# Patient Record
Sex: FEMALE | Race: WHITE | NOT HISPANIC OR LATINO | URBAN - METROPOLITAN AREA
[De-identification: names, ages, dates, MRNs, and addresses within clinical notes are randomized per-mention and may not be internally consistent; named-entity substitution may affect disease eponyms.]

---

## 2023-04-18 ASSESSMENT — PROMIS GLOBAL HEALTH SCALE
EMOTIONAL_PROBLEMS: ALWAYS
RATE_AVERAGE_PAIN: 5
RATE_QUALITY_OF_LIFE: FAIR
RATE_MENTAL_HEALTH: FAIR
RATE_SOCIAL_SATISFACTION: FAIR
RATE_AVERAGE_FATIGUE: MODERATE
CARRYOUT_PHYSICAL_ACTIVITIES: COMPLETELY
RATE_GENERAL_HEALTH: FAIR
RATE_MENTAL_HEALTH: FAIR
CARRYOUT_SOCIAL_ACTIVITIES: POOR
RATE_SOCIAL_SATISFACTION: FAIR
RATE_AVERAGE_PAIN: 5
RATE_PHYSICAL_HEALTH: FAIR
RATE_AVERAGE_FATIGUE: MODERATE
CARRYOUT_SOCIAL_ACTIVITIES: POOR
EMOTIONAL_PROBLEMS: ALWAYS
RATE_QUALITY_OF_LIFE: FAIR
RATE_PHYSICAL_HEALTH: FAIR
CARRYOUT_PHYSICAL_ACTIVITIES: COMPLETELY
RATE_GENERAL_HEALTH: FAIR

## 2023-04-20 ENCOUNTER — APPOINTMENT (OUTPATIENT)
Dept: PRIMARY CARE | Facility: CLINIC | Age: 38
End: 2023-04-20
Payer: COMMERCIAL

## 2023-04-20 PROBLEM — F33.41 RECURRENT MAJOR DEPRESSIVE DISORDER, IN PARTIAL REMISSION (CMS-HCC): Status: ACTIVE | Noted: 2023-04-20

## 2023-04-20 PROBLEM — F31.9 BIPOLAR 1 DISORDER (MULTI): Status: ACTIVE | Noted: 2023-04-20

## 2023-04-20 PROBLEM — F90.9 ADHD: Status: ACTIVE | Noted: 2023-04-20

## 2023-04-20 RX ORDER — SERTRALINE HYDROCHLORIDE 100 MG/1
150 TABLET, FILM COATED ORAL DAILY
COMMUNITY
Start: 2023-01-27 | End: 2023-09-11 | Stop reason: ALTCHOICE

## 2023-05-31 ENCOUNTER — OFFICE VISIT (OUTPATIENT)
Dept: PRIMARY CARE | Facility: CLINIC | Age: 38
End: 2023-05-31
Payer: COMMERCIAL

## 2023-05-31 VITALS
SYSTOLIC BLOOD PRESSURE: 98 MMHG | RESPIRATION RATE: 18 BRPM | DIASTOLIC BLOOD PRESSURE: 62 MMHG | HEART RATE: 70 BPM | TEMPERATURE: 98.6 F | WEIGHT: 150 LBS | HEIGHT: 69 IN | BODY MASS INDEX: 22.22 KG/M2

## 2023-05-31 DIAGNOSIS — N92.6 IRREGULAR MENSES: ICD-10-CM

## 2023-05-31 DIAGNOSIS — F31.9 BIPOLAR 1 DISORDER (MULTI): ICD-10-CM

## 2023-05-31 DIAGNOSIS — Z11.3 ROUTINE SCREENING FOR STI (SEXUALLY TRANSMITTED INFECTION): ICD-10-CM

## 2023-05-31 DIAGNOSIS — Z00.00 ENCOUNTER FOR PREVENTIVE HEALTH EXAMINATION: Primary | ICD-10-CM

## 2023-05-31 PROCEDURE — 99395 PREV VISIT EST AGE 18-39: CPT | Performed by: FAMILY MEDICINE

## 2023-05-31 PROCEDURE — 1036F TOBACCO NON-USER: CPT | Performed by: FAMILY MEDICINE

## 2023-05-31 RX ORDER — PRAZOSIN HYDROCHLORIDE 1 MG/1
1 CAPSULE ORAL
COMMUNITY
Start: 2023-04-27 | End: 2023-09-11 | Stop reason: ALTCHOICE

## 2023-05-31 RX ORDER — LAMOTRIGINE 25 MG/1
2 TABLET ORAL DAILY
COMMUNITY
Start: 2023-05-10 | End: 2023-09-11 | Stop reason: ALTCHOICE

## 2023-05-31 RX ORDER — METHYLPHENIDATE HYDROCHLORIDE 54 MG/1
54 TABLET ORAL
COMMUNITY
Start: 2022-03-09 | End: 2023-05-31 | Stop reason: ALTCHOICE

## 2023-05-31 NOTE — PROGRESS NOTES
Renea Montana is a 37 y.o. female here today for   Chief Complaint   Patient presents with    Annual Exam   Last pap 2 years ago, looking for new GYN .      No PMH of STI.  Would like to have STI screening.  Last pap was 2 years ago and normal.  She reports no symptoms to suggest an STI.  She has had 1 partner in the last 7 years but he does sleep with other women.    She did see the psychiatrist and a counselor now.  She is back on lamotrigine and they added prazosin at bedtime.  She is also taking sertraline.  She says that her bipolar symptoms seem stable.    Over the last 3 months - Menses are lasting longer and sometimes 3 weeks long.  But then are very light and irregular.  She does have a history of irregular menses off and on over the last 5 years.  She is not currently on any form of contraception.  She says there is no chance of pregnancy.  She would like to establish with a new gynecologist.  [facility] has been [facility].        Objective    Visit Vitals  BP 98/62   Pulse 70   Temp 37 °C (98.6 °F)   Resp 18     Body mass index is 22.15 kg/m².     Physical Exam  Vitals and nursing note reviewed.   Constitutional:       General: She is not in acute distress.     Appearance: Normal appearance.   HENT:      Head: Normocephalic and atraumatic.      Right Ear: Tympanic membrane, ear canal and external ear normal.      Left Ear: Tympanic membrane, ear canal and external ear normal.      Nose: Nose normal.      Mouth/Throat:      Mouth: Mucous membranes are moist.      Pharynx: Oropharynx is clear.   Eyes:      Extraocular Movements: Extraocular movements intact.      Conjunctiva/sclera: Conjunctivae normal.      Pupils: Pupils are equal, round, and reactive to light.   Cardiovascular:      Rate and Rhythm: Normal rate and regular rhythm.      Pulses: Normal pulses.      Heart sounds: Normal heart sounds. No murmur heard.     No friction rub. No gallop.   Pulmonary:      Effort: Pulmonary effort is  normal. No respiratory distress.      Breath sounds: Normal breath sounds.   Chest:   Breasts:     Right: Normal. No mass, nipple discharge or skin change.      Left: Normal. No mass, nipple discharge or skin change.   Abdominal:      General: Abdomen is flat. Bowel sounds are normal. There is no distension.      Palpations: Abdomen is soft.      Tenderness: There is no abdominal tenderness.   Musculoskeletal:         General: Normal range of motion.      Cervical back: Normal range of motion and neck supple.   Lymphadenopathy:      Cervical: No cervical adenopathy.      Upper Body:      Right upper body: No axillary adenopathy.      Left upper body: No axillary adenopathy.   Skin:     General: Skin is warm and dry.      Findings: No lesion or rash.   Neurological:      General: No focal deficit present.      Mental Status: She is alert. Mental status is at baseline.   Psychiatric:         Mood and Affect: Mood normal.         Behavior: Behavior normal.         Thought Content: Thought content normal.         Judgment: Judgment normal.            Assessment    1. Encounter for preventive health examination  Comprehensive Metabolic Panel, CBC, Lipid Panel, TSH with reflex to Free T4 if abnormal, Vitamin D, Total   Recommend regular exercise, balanced diet, regular dental exams, and healthy habits.  Appropriate labs ordered or reviewed.  Her immunizations are up-to-date.  Her Pap tests are up-to-date and I recommend monthly self breast exams.  We discussed safe sex and condom use.  I recommend a yearly flu shot in the fall and I recommend a yearly wellness exam.     2. Irregular menses  Referral to Obstetrics / Gynecology   She would like to establish with a new gynecologist for irregular menses and routine gynecological care.  I will refer her to Dr. Castillo.     3. Routine screening for STI (sexually transmitted infection)  C. Trachomatis / N. Gonorrhoeae, Amplified Detection, HIV 1/2 Antigen/Antibody Screen with  Reflex to Confirmation, Syphilis Screen with Reflex, Hepatitis B Core Antibody, IgM, Hepatitis C Antibody   We discussed STIs that can be checked for with urine and blood tests.  She would like to have all of these test done and we will discuss it further with her new gynecologist at their first appointment.     4. Bipolar 1 disorder (CMS/HCC)     Patient is now seeing a psychiatrist and counselor and seems to be doing well on her current medications.

## 2023-06-02 ENCOUNTER — LAB (OUTPATIENT)
Dept: LAB | Facility: LAB | Age: 38
End: 2023-06-02
Payer: COMMERCIAL

## 2023-06-02 DIAGNOSIS — Z11.3 ROUTINE SCREENING FOR STI (SEXUALLY TRANSMITTED INFECTION): ICD-10-CM

## 2023-06-02 DIAGNOSIS — Z00.00 ENCOUNTER FOR PREVENTIVE HEALTH EXAMINATION: ICD-10-CM

## 2023-06-02 LAB
ALANINE AMINOTRANSFERASE (SGPT) (U/L) IN SER/PLAS: 16 U/L (ref 7–45)
ALBUMIN (G/DL) IN SER/PLAS: 4.1 G/DL (ref 3.4–5)
ALKALINE PHOSPHATASE (U/L) IN SER/PLAS: 46 U/L (ref 33–110)
ANION GAP IN SER/PLAS: 13 MMOL/L (ref 10–20)
ASPARTATE AMINOTRANSFERASE (SGOT) (U/L) IN SER/PLAS: 19 U/L (ref 9–39)
BILIRUBIN TOTAL (MG/DL) IN SER/PLAS: 0.6 MG/DL (ref 0–1.2)
CALCIDIOL (25 OH VITAMIN D3) (NG/ML) IN SER/PLAS: 28 NG/ML
CALCIUM (MG/DL) IN SER/PLAS: 9.2 MG/DL (ref 8.6–10.3)
CARBON DIOXIDE, TOTAL (MMOL/L) IN SER/PLAS: 27 MMOL/L (ref 21–32)
CHLORIDE (MMOL/L) IN SER/PLAS: 101 MMOL/L (ref 98–107)
CHOLESTEROL (MG/DL) IN SER/PLAS: 151 MG/DL (ref 0–199)
CHOLESTEROL IN HDL (MG/DL) IN SER/PLAS: 74.3 MG/DL
CHOLESTEROL/HDL RATIO: 2
CREATININE (MG/DL) IN SER/PLAS: 0.78 MG/DL (ref 0.5–1.05)
ERYTHROCYTE DISTRIBUTION WIDTH (RATIO) BY AUTOMATED COUNT: 11.6 % (ref 11.5–14.5)
ERYTHROCYTE MEAN CORPUSCULAR HEMOGLOBIN CONCENTRATION (G/DL) BY AUTOMATED: 32.4 G/DL (ref 32–36)
ERYTHROCYTE MEAN CORPUSCULAR VOLUME (FL) BY AUTOMATED COUNT: 93 FL (ref 80–100)
ERYTHROCYTES (10*6/UL) IN BLOOD BY AUTOMATED COUNT: 4.57 X10E12/L (ref 4–5.2)
GFR FEMALE: >90 ML/MIN/1.73M2
GLUCOSE (MG/DL) IN SER/PLAS: 97 MG/DL (ref 74–99)
HEMATOCRIT (%) IN BLOOD BY AUTOMATED COUNT: 42.6 % (ref 36–46)
HEMOGLOBIN (G/DL) IN BLOOD: 13.8 G/DL (ref 12–16)
HEPATITIS B VIRUS CORE IGM AB PRESENCE IN SER/PLAS BY IMMUNOASSY: NONREACTIVE
HEPATITIS C VIRUS AB PRESENCE IN SERUM: NONREACTIVE
HIV 1/ 2 AG/AB SCREEN: NONREACTIVE
LDL: 60 MG/DL (ref 0–99)
LEUKOCYTES (10*3/UL) IN BLOOD BY AUTOMATED COUNT: 6.2 X10E9/L (ref 4.4–11.3)
NRBC (PER 100 WBCS) BY AUTOMATED COUNT: 0 /100 WBC (ref 0–0)
PLATELETS (10*3/UL) IN BLOOD AUTOMATED COUNT: 234 X10E9/L (ref 150–450)
POTASSIUM (MMOL/L) IN SER/PLAS: 4.6 MMOL/L (ref 3.5–5.3)
PROTEIN TOTAL: 6.6 G/DL (ref 6.4–8.2)
SODIUM (MMOL/L) IN SER/PLAS: 136 MMOL/L (ref 136–145)
SYPHILIS TOTAL AB: NONREACTIVE
THYROTROPIN (MIU/L) IN SER/PLAS BY DETECTION LIMIT <= 0.05 MIU/L: 0.64 MIU/L (ref 0.44–3.98)
TRIGLYCERIDE (MG/DL) IN SER/PLAS: 84 MG/DL (ref 0–149)
UREA NITROGEN (MG/DL) IN SER/PLAS: 8 MG/DL (ref 6–23)
VLDL: 17 MG/DL (ref 0–40)

## 2023-06-02 PROCEDURE — 86803 HEPATITIS C AB TEST: CPT

## 2023-06-02 PROCEDURE — 86780 TREPONEMA PALLIDUM: CPT

## 2023-06-02 PROCEDURE — 80053 COMPREHEN METABOLIC PANEL: CPT

## 2023-06-02 PROCEDURE — 84443 ASSAY THYROID STIM HORMONE: CPT

## 2023-06-02 PROCEDURE — 86705 HEP B CORE ANTIBODY IGM: CPT

## 2023-06-02 PROCEDURE — 36415 COLL VENOUS BLD VENIPUNCTURE: CPT

## 2023-06-02 PROCEDURE — 85027 COMPLETE CBC AUTOMATED: CPT

## 2023-06-02 PROCEDURE — 80061 LIPID PANEL: CPT

## 2023-06-02 PROCEDURE — 82306 VITAMIN D 25 HYDROXY: CPT

## 2023-06-02 PROCEDURE — 87491 CHLMYD TRACH DNA AMP PROBE: CPT

## 2023-06-02 PROCEDURE — 87389 HIV-1 AG W/HIV-1&-2 AB AG IA: CPT

## 2023-06-02 PROCEDURE — 87591 N.GONORRHOEAE DNA AMP PROB: CPT

## 2023-06-03 LAB
CHLAMYDIA TRACH., AMPLIFIED: NEGATIVE
N. GONORRHEA, AMPLIFIED: NEGATIVE

## 2023-06-05 ENCOUNTER — TELEPHONE (OUTPATIENT)
Dept: PRIMARY CARE | Facility: CLINIC | Age: 38
End: 2023-06-05
Payer: COMMERCIAL

## 2023-06-05 NOTE — TELEPHONE ENCOUNTER
----- Message from Eduardo Sevilla MD sent at 6/5/2023  7:38 AM EDT -----  Inform patient of normal results of all recent labs.   Even though some of the lab values may fall outside of the normal range, I do not feel they are clinically concerning or relevant at this time.    All of the STI screening was negative.  Her vitamin D level was slightly low at 28.  I recommend that she take 2000 units of vitamin D over-the-counter daily.

## 2023-06-05 NOTE — RESULT ENCOUNTER NOTE
Inform patient of normal results of all recent labs.   Even though some of the lab values may fall outside of the normal range, I do not feel they are clinically concerning or relevant at this time.    All of the STI screening was negative.  Her vitamin D level was slightly low at 28.  I recommend that she take 2000 units of vitamin D over-the-counter daily.

## 2023-06-08 ENCOUNTER — APPOINTMENT (OUTPATIENT)
Dept: PRIMARY CARE | Facility: CLINIC | Age: 38
End: 2023-06-08
Payer: COMMERCIAL

## 2023-07-25 ENCOUNTER — APPOINTMENT (OUTPATIENT)
Dept: PRIMARY CARE | Facility: CLINIC | Age: 38
End: 2023-07-25
Payer: COMMERCIAL

## 2023-09-11 ENCOUNTER — PROCEDURE VISIT (OUTPATIENT)
Dept: PRIMARY CARE | Facility: CLINIC | Age: 38
End: 2023-09-11
Payer: COMMERCIAL

## 2023-09-11 VITALS
SYSTOLIC BLOOD PRESSURE: 118 MMHG | RESPIRATION RATE: 16 BRPM | DIASTOLIC BLOOD PRESSURE: 72 MMHG | HEIGHT: 69 IN | TEMPERATURE: 97.1 F | BODY MASS INDEX: 22.36 KG/M2 | WEIGHT: 151 LBS | HEART RATE: 80 BPM

## 2023-09-11 DIAGNOSIS — L81.9 CHANGE IN MULTIPLE PIGMENTED SKIN LESIONS: Primary | ICD-10-CM

## 2023-09-11 PROBLEM — F32.A DEPRESSION: Status: ACTIVE | Noted: 2019-01-22

## 2023-09-11 PROBLEM — F40.10 SOCIAL PHOBIA, UNSPECIFIED: Status: ACTIVE | Noted: 2021-05-25

## 2023-09-11 PROBLEM — F41.1 GENERALIZED ANXIETY DISORDER: Status: ACTIVE | Noted: 2021-05-25

## 2023-09-11 PROBLEM — F98.8 OTHER SPECIFIED BEHAVIORAL AND EMOTIONAL DISORDERS WITH ONSET USUALLY OCCURRING IN CHILDHOOD AND ADOLESCENCE: Status: ACTIVE | Noted: 2021-05-25

## 2023-09-11 PROBLEM — F32.0 MILD SINGLE CURRENT EPISODE OF MAJOR DEPRESSIVE DISORDER (CMS-HCC): Status: ACTIVE | Noted: 2017-09-14

## 2023-09-11 PROCEDURE — 11301 SHAVE SKIN LESION 0.6-1.0 CM: CPT | Performed by: FAMILY MEDICINE

## 2023-09-11 NOTE — PROGRESS NOTES
"Renea Montana is a 38 y.o. female here today for   Chief Complaint   Patient presents with    Nevus   Possible mole removal on lower back      HPI   Patient is here for shave removal of 2 changing nevi of her skin.  First is on her left lower back and about 8 mm in size.  Present for years but has slowly gotten bigger and more dark.  The second nevus is on her central upper abdomen and is about 6 mm in size with a similar history.      Current Outpatient Medications:     lamoTRIgine (LaMICtal) 25 mg tablet, Take 2 tablets (50 mg) by mouth once daily., Disp: , Rfl:     prazosin (Minipress) 1 mg capsule, Take 1 capsule (1 mg) by mouth. AT BEDTIME, Disp: , Rfl:     sertraline (Zoloft) 100 mg tablet, Take 1.5 tablets (150 mg) by mouth once daily., Disp: , Rfl:     Patient Active Problem List   Diagnosis    Bipolar 1 disorder (CMS/HCC)    ADHD    Recurrent major depressive disorder, in partial remission (CMS/HCC)    Irregular menses    Alteration of consciousness    Blurred vision, right eye    Clonus    Depression    Generalized anxiety disorder    Hyper reflexia    Other specified behavioral and emotional disorders with onset usually occurring in childhood and adolescence    Mild single current episode of major depressive disorder (CMS/HCC)    Social phobia, unspecified    Tingling    Tremor    Urinary frequency         No results found for this or any previous visit (from the past 672 hour(s)).     Objective    Visit Vitals  Temp 36.2 °C (97.1 °F)   Resp 16   Ht 1.753 m (5' 9\")   Wt 68.5 kg (151 lb)   BMI 22.30 kg/m²     Body mass index is 22.3 kg/m².     Physical Exam       Assessment    No diagnosis found.    Shaving Epidermal/Dermal    Date/Time: 9/11/2023 12:51 PM    Performed by: Eduardo Sevilla MD  Authorized by: Eduardo Sevilla MD    Number of Lesions: 2  Lesion 1:     Body area: trunk    Trunk location: back    Initial size (mm): 8    Malignancy: malignancy unknown      Destruction method: scissors used " for extraction    Lesion 2:     Body area: trunk    Trunk location: abdomen    Initial size (mm): 6    Malignancy: malignancy unknown      Destruction method: scissors used for extraction      Comments:  Patient and I discussed the risk versus benefits of a shave biopsy for these 2 lesions.  I explained possible complications including scarring, bleeding, pain.  Patient fully understands and consents to the procedure.  Sterile technique was used throughout.  Both lesions were prepped with iodine and sterilely draped.  Each was anesthetized with 1% lidocaine with epinephrine 0.1 mL for each lesion.  A shave biopsy was done of both lesions using curved iris scissors and silver nitrate sticks were used to control very mild bleeding.  Both lesions were sent for pathology.  The areas were dressed with antibiotic ointment and a Band-Aid.  Patient tolerated the procedure very well with no complications.  We will call her when pathology is available.  If she does not hear from us in 2 weeks she should call us.  We will follow-up as needed.

## 2023-09-14 NOTE — PROGRESS NOTES
Renea Montana is a 38 y.o. female here today for No chief complaint on file.       HPI       No current outpatient medications on file.    Patient Active Problem List   Diagnosis    Bipolar 1 disorder (CMS/HCC)    ADHD    Recurrent major depressive disorder, in partial remission (CMS/HCC)    Irregular menses    Alteration of consciousness    Blurred vision, right eye    Clonus    Depression    Generalized anxiety disorder    Hyper reflexia    Other specified behavioral and emotional disorders with onset usually occurring in childhood and adolescence    Mild single current episode of major depressive disorder (CMS/HCC)    Social phobia, unspecified    Tingling    Tremor    Urinary frequency         No results found for this or any previous visit (from the past 672 hour(s)).     Objective    Visit Vitals  There were no vitals taken for this visit.    There is no height or weight on file to calculate BMI.     Physical Exam       Assessment    No diagnosis found.

## 2023-09-18 ENCOUNTER — TELEPHONE (OUTPATIENT)
Dept: PRIMARY CARE | Facility: CLINIC | Age: 38
End: 2023-09-18
Payer: COMMERCIAL

## 2024-03-27 ENCOUNTER — OFFICE VISIT (OUTPATIENT)
Dept: PRIMARY CARE | Facility: CLINIC | Age: 39
End: 2024-03-27
Payer: COMMERCIAL

## 2024-03-27 VITALS
TEMPERATURE: 97.4 F | RESPIRATION RATE: 16 BRPM | WEIGHT: 154 LBS | BODY MASS INDEX: 22.81 KG/M2 | HEIGHT: 69 IN | SYSTOLIC BLOOD PRESSURE: 122 MMHG | HEART RATE: 93 BPM | DIASTOLIC BLOOD PRESSURE: 80 MMHG

## 2024-03-27 DIAGNOSIS — R20.2 NUMBNESS AND TINGLING OF LOWER EXTREMITY: ICD-10-CM

## 2024-03-27 DIAGNOSIS — R20.2 NUMBNESS AND TINGLING OF BOTH UPPER EXTREMITIES: Primary | ICD-10-CM

## 2024-03-27 DIAGNOSIS — R20.0 NUMBNESS AND TINGLING OF BOTH UPPER EXTREMITIES: Primary | ICD-10-CM

## 2024-03-27 DIAGNOSIS — R20.0 NUMBNESS AND TINGLING OF LOWER EXTREMITY: ICD-10-CM

## 2024-03-27 DIAGNOSIS — R68.89 COLD INTOLERANCE: ICD-10-CM

## 2024-03-27 PROCEDURE — 99214 OFFICE O/P EST MOD 30 MIN: CPT | Performed by: FAMILY MEDICINE

## 2024-03-27 PROCEDURE — 1036F TOBACCO NON-USER: CPT | Performed by: FAMILY MEDICINE

## 2024-03-27 NOTE — PROGRESS NOTES
"Renea Montana is a 38 y.o. female here today for   Chief Complaint   Patient presents with    Numbness     Hands, feet, and legs  Intense past 2 years         HPI   Present for a few years.  Mild and constant and much worse when cold.  Hands, feet and legs.  She says she notices a mild decreased sensation in hands and fingers.  Feet are very sensitive - get sore easily.  Tight shoes hurt a lot.  Gets blisters and sores on feet easily.  No color changes noted.  Legs feel more weak and  feels more weak at times.  But it is nothing that is preventing her from doing her regular activities.  She says the only reason she came in was because her mom insisted on it.  There is no family history of degenerative neurological conditions.  She has no history of lead or mercury exposure.  She does not drink alcohol or use tobacco products or drugs.  There is no chance of pregnancy.    Also with occasional \"nerve pain in colon\" about 1-2 times per month.  Wakes her up with shooting pain and bad for a few minutes and then resolves.      No current outpatient medications on file.    Patient Active Problem List   Diagnosis    Bipolar 1 disorder (CMS/HCC)    ADHD    Recurrent major depressive disorder, in partial remission (CMS/HCC)    Irregular menses    Alteration of consciousness    Blurred vision, right eye    Clonus    Depression    Generalized anxiety disorder    Hyper reflexia    Other specified behavioral and emotional disorders with onset usually occurring in childhood and adolescence    Mild single current episode of major depressive disorder (CMS/HCC)    Social phobia, unspecified    Tingling    Tremor    Urinary frequency    Numbness and tingling of both upper extremities    Numbness and tingling of lower extremity    Cold intolerance         No results found for this or any previous visit (from the past 672 hour(s)).     Objective    Visit Vitals    Visit Vitals  /80   Pulse 93   Temp 36.3 °C (97.4 °F)   Resp " "16   Ht 1.753 m (5' 9\")   Wt 69.9 kg (154 lb)   BMI 22.74 kg/m²   Smoking Status Never   BSA 1.84 m²       Body mass index is 22.74 kg/m².     Physical Exam   Hands-normal pulses and color and temperature with good capillary refill.  Normal sensation with monofilament testing in all nerve distributions.  Normal strength and range of motion.    Feet-normal pulses and color and temperature with good capillary refill.  Normal sensation with monofilament testing in all nerve distributions.  Normal strength and range of motion.    Assessment    1. Numbness and tingling of both upper extremities  Comprehensive Metabolic Panel, CBC, Thyroid Stimulating Hormone, Vitamin D 25-Hydroxy,Total (for eval of Vitamin D levels), Vitamin B12, Folate, Ferritin, C-Reactive Protein, Sedimentation Rate, TRACI with Reflex to FIFI, Lead, blood, Heavy Metals, Blood, Referral to Neurology   I am not sure what is causing her symptoms and we discussed options for further evaluation.  We are going to get some labs to rule out possible etiologies and also look for autoimmune issues.  I am going to refer her to neurology for further evaluation.  She probably will need a EMG to further define these issues.  We will call her with results and follow-up accordingly.     2. Numbness and tingling of lower extremity  Comprehensive Metabolic Panel, CBC, Thyroid Stimulating Hormone, Vitamin D 25-Hydroxy,Total (for eval of Vitamin D levels), Vitamin B12, Folate, Ferritin, C-Reactive Protein, Sedimentation Rate, TRACI with Reflex to FIFI, Lead, blood, Heavy Metals, Blood, Referral to Neurology      3. Cold intolerance  Comprehensive Metabolic Panel, CBC, Thyroid Stimulating Hormone, Vitamin D 25-Hydroxy,Total (for eval of Vitamin D levels), Vitamin B12, Folate, Ferritin, C-Reactive Protein, Sedimentation Rate, TRACI with Reflex to FIFI, Lead, blood, Heavy Metals, Blood, Referral to Neurology                  Orders Placed This Encounter   Procedures    Comprehensive " Metabolic Panel    CBC    Thyroid Stimulating Hormone    Vitamin D 25-Hydroxy,Total (for eval of Vitamin D levels)    Vitamin B12    Folate    Ferritin    C-Reactive Protein    Sedimentation Rate    TRACI with Reflex to FIFI    Lead, blood    Heavy Metals, Blood    Referral to Neurology        No orders of the defined types were placed in this encounter.

## 2024-03-28 ENCOUNTER — LAB (OUTPATIENT)
Dept: LAB | Facility: LAB | Age: 39
End: 2024-03-28
Payer: COMMERCIAL

## 2024-03-28 DIAGNOSIS — R20.0 NUMBNESS AND TINGLING OF BOTH UPPER EXTREMITIES: ICD-10-CM

## 2024-03-28 DIAGNOSIS — R20.2 NUMBNESS AND TINGLING OF BOTH UPPER EXTREMITIES: ICD-10-CM

## 2024-03-28 DIAGNOSIS — R20.2 NUMBNESS AND TINGLING OF LOWER EXTREMITY: ICD-10-CM

## 2024-03-28 DIAGNOSIS — R20.0 NUMBNESS AND TINGLING OF LOWER EXTREMITY: ICD-10-CM

## 2024-03-28 DIAGNOSIS — R68.89 COLD INTOLERANCE: ICD-10-CM

## 2024-03-28 LAB
25(OH)D3 SERPL-MCNC: 37 NG/ML (ref 30–100)
ALBUMIN SERPL BCP-MCNC: 4.5 G/DL (ref 3.4–5)
ALP SERPL-CCNC: 42 U/L (ref 33–110)
ALT SERPL W P-5'-P-CCNC: 14 U/L (ref 7–45)
ANION GAP SERPL CALC-SCNC: 11 MMOL/L (ref 10–20)
AST SERPL W P-5'-P-CCNC: 16 U/L (ref 9–39)
BILIRUB SERPL-MCNC: 0.4 MG/DL (ref 0–1.2)
BUN SERPL-MCNC: 9 MG/DL (ref 6–23)
CALCIUM SERPL-MCNC: 9.7 MG/DL (ref 8.6–10.3)
CHLORIDE SERPL-SCNC: 103 MMOL/L (ref 98–107)
CO2 SERPL-SCNC: 29 MMOL/L (ref 21–32)
CREAT SERPL-MCNC: 0.69 MG/DL (ref 0.5–1.05)
CRP SERPL-MCNC: <0.1 MG/DL
EGFRCR SERPLBLD CKD-EPI 2021: >90 ML/MIN/1.73M*2
ERYTHROCYTE [DISTWIDTH] IN BLOOD BY AUTOMATED COUNT: 12 % (ref 11.5–14.5)
ERYTHROCYTE [SEDIMENTATION RATE] IN BLOOD BY WESTERGREN METHOD: 13 MM/H (ref 0–20)
FERRITIN SERPL-MCNC: 60 NG/ML (ref 8–150)
FOLATE SERPL-MCNC: 20.5 NG/ML
GLUCOSE SERPL-MCNC: 105 MG/DL (ref 74–99)
HCT VFR BLD AUTO: 44.5 % (ref 36–46)
HGB BLD-MCNC: 14.2 G/DL (ref 12–16)
LEAD BLD-MCNC: 0.5 UG/DL
MCH RBC QN AUTO: 30.1 PG (ref 26–34)
MCHC RBC AUTO-ENTMCNC: 31.9 G/DL (ref 32–36)
MCV RBC AUTO: 94 FL (ref 80–100)
NRBC BLD-RTO: 0 /100 WBCS (ref 0–0)
PLATELET # BLD AUTO: 274 X10*3/UL (ref 150–450)
POTASSIUM SERPL-SCNC: 3.8 MMOL/L (ref 3.5–5.3)
PROT SERPL-MCNC: 7.1 G/DL (ref 6.4–8.2)
RBC # BLD AUTO: 4.72 X10*6/UL (ref 4–5.2)
SODIUM SERPL-SCNC: 139 MMOL/L (ref 136–145)
TSH SERPL-ACNC: 0.68 MIU/L (ref 0.44–3.98)
VIT B12 SERPL-MCNC: 323 PG/ML (ref 211–911)
WBC # BLD AUTO: 5.5 X10*3/UL (ref 4.4–11.3)

## 2024-03-28 PROCEDURE — 83825 ASSAY OF MERCURY: CPT

## 2024-03-28 PROCEDURE — 84443 ASSAY THYROID STIM HORMONE: CPT

## 2024-03-28 PROCEDURE — 82746 ASSAY OF FOLIC ACID SERUM: CPT

## 2024-03-28 PROCEDURE — 86038 ANTINUCLEAR ANTIBODIES: CPT

## 2024-03-28 PROCEDURE — 82306 VITAMIN D 25 HYDROXY: CPT

## 2024-03-28 PROCEDURE — 86140 C-REACTIVE PROTEIN: CPT

## 2024-03-28 PROCEDURE — 36415 COLL VENOUS BLD VENIPUNCTURE: CPT

## 2024-03-28 PROCEDURE — 82728 ASSAY OF FERRITIN: CPT

## 2024-03-28 PROCEDURE — 83655 ASSAY OF LEAD: CPT

## 2024-03-28 PROCEDURE — 80053 COMPREHEN METABOLIC PANEL: CPT

## 2024-03-28 PROCEDURE — 85652 RBC SED RATE AUTOMATED: CPT

## 2024-03-28 PROCEDURE — 82607 VITAMIN B-12: CPT

## 2024-03-28 PROCEDURE — 85027 COMPLETE CBC AUTOMATED: CPT

## 2024-03-29 LAB — ANA SER QL HEP2 SUBST: NEGATIVE

## 2024-03-30 LAB
ARSENIC BLD-MCNC: <10 UG/L
LEAD BLDV-MCNC: <2 UG/DL
MERCURY BLD-MCNC: <2.5 UG/L

## 2024-08-21 ENCOUNTER — APPOINTMENT (OUTPATIENT)
Dept: NEUROLOGY | Facility: CLINIC | Age: 39
End: 2024-08-21
Payer: COMMERCIAL

## 2024-08-21 VITALS
TEMPERATURE: 97.1 F | WEIGHT: 139 LBS | HEIGHT: 69 IN | DIASTOLIC BLOOD PRESSURE: 64 MMHG | SYSTOLIC BLOOD PRESSURE: 102 MMHG | BODY MASS INDEX: 20.59 KG/M2 | HEART RATE: 80 BPM

## 2024-08-21 DIAGNOSIS — R68.89 COLD INTOLERANCE: ICD-10-CM

## 2024-08-21 DIAGNOSIS — R20.2 NUMBNESS AND TINGLING OF LOWER EXTREMITY: ICD-10-CM

## 2024-08-21 DIAGNOSIS — R20.2 NUMBNESS AND TINGLING OF BOTH UPPER EXTREMITIES: ICD-10-CM

## 2024-08-21 DIAGNOSIS — R20.0 NUMBNESS AND TINGLING OF BOTH UPPER EXTREMITIES: ICD-10-CM

## 2024-08-21 DIAGNOSIS — R20.0 NUMBNESS AND TINGLING OF LOWER EXTREMITY: ICD-10-CM

## 2024-08-21 PROCEDURE — 3008F BODY MASS INDEX DOCD: CPT | Performed by: STUDENT IN AN ORGANIZED HEALTH CARE EDUCATION/TRAINING PROGRAM

## 2024-08-21 PROCEDURE — 99204 OFFICE O/P NEW MOD 45 MIN: CPT | Performed by: STUDENT IN AN ORGANIZED HEALTH CARE EDUCATION/TRAINING PROGRAM

## 2024-08-21 RX ORDER — SERTRALINE HYDROCHLORIDE 100 MG/1
100 TABLET, FILM COATED ORAL DAILY
COMMUNITY
Start: 2024-08-20

## 2024-08-21 RX ORDER — PROPRANOLOL HYDROCHLORIDE 10 MG/1
10 TABLET ORAL CONTINUOUS PRN
COMMUNITY
Start: 2024-08-20

## 2024-08-21 ASSESSMENT — PATIENT HEALTH QUESTIONNAIRE - PHQ9
2. FEELING DOWN, DEPRESSED OR HOPELESS: NOT AT ALL
SUM OF ALL RESPONSES TO PHQ9 QUESTIONS 1 & 2: 0
1. LITTLE INTEREST OR PLEASURE IN DOING THINGS: NOT AT ALL

## 2024-08-21 ASSESSMENT — LIFESTYLE VARIABLES
HOW OFTEN DO YOU HAVE SIX OR MORE DRINKS ON ONE OCCASION: MONTHLY
HOW OFTEN DO YOU HAVE A DRINK CONTAINING ALCOHOL: MONTHLY OR LESS
HOW MANY STANDARD DRINKS CONTAINING ALCOHOL DO YOU HAVE ON A TYPICAL DAY: 1 OR 2
AUDIT-C TOTAL SCORE: 3
SKIP TO QUESTIONS 9-10: 0

## 2024-08-21 NOTE — PROGRESS NOTES
HPI  Renea Montana is a 39 y.o. year old female with h/o depression. Seen in neurology clinic for sensory changes and pain.     Numbness and tingling in both arms and legs. Covers all the legs (hip to foot). Coves both arms elbows to fingers, more prominent in hands.   Not constant, on and off. No clear diurnal pattern. Can't think of provoking, exacerbating or alleviating factors. Pt states that she has to be moving all the time to help with the sxs, that seems to be the only thing that help.    Pt also has shooting pains in her legs, and in her Rt elbow. Not constant, on and off, described as discomfort more than pain. Brought on by stress, and sometimes with exercise. Started in early 20s.    Pt also complained of multifocal joint pain, diffuse body pains, feeling of being tense. No clear relationship to menstrual cycle.    Sensitive to cold     Work up with CBC, CMP, Vitamin B12, D, Folate, TSH, CRP, ESR, Heavy metals and lead, ferritin, TRACI w/ FIFI, HIV, Hep B/C, all wnl    Pt also has depression, currently Sertaraline 100mg, stated that she had a severe course of illness, c/f suicide attempts and hospitalizatons. Has tried Cymbalta in her early 20s but before onset of current sxs.    Pt works as nursing assistant and phleobolotmist    Review of Systems    Patient Active Problem List   Diagnosis    Bipolar 1 disorder (Multi)    ADHD    Recurrent major depressive disorder, in partial remission (CMS-HCC)    Irregular menses    Alteration of consciousness    Blurred vision, right eye    Clonus    Depression    Generalized anxiety disorder    Hyper reflexia    Other specified behavioral and emotional disorders with onset usually occurring in childhood and adolescence    Mild single current episode of major depressive disorder (CMS-HCC)    Social phobia, unspecified    Tingling    Tremor    Urinary frequency    Numbness and tingling of both upper extremities    Numbness and tingling of lower extremity    Cold  intolerance     Past Medical History:   Diagnosis Date    ADHD (attention deficit hyperactivity disorder)     Anxiety     Depression      No past surgical history on file.  Social History     Tobacco Use    Smoking status: Some Days     Current packs/day: 0.25     Types: Cigarettes     Passive exposure: Never    Smokeless tobacco: Never   Substance Use Topics    Alcohol use: Yes     family history is not on file.    Current Outpatient Medications:     propranolol (Inderal) 10 mg tablet, Take 1 tablet (10 mg) by mouth continuously if needed., Disp: , Rfl:     sertraline (Zoloft) 100 mg tablet, Take 1 tablet (100 mg) by mouth once daily., Disp: , Rfl:   No Known Allergies    Objective   Neurological Exam  Physical Exam  GENERAL APPEARANCE:  No distress, alert and cooperative.     CARDIOVASCULAR: Regular, rate and rhythm, without murmur. No carotid bruits. Pulses +2 and equal in all extremities. No edema, or tenderness to palpation.    MENTAL STATE:  Orientation was normal to time, place and person. Recent and remote memory was intact.  Attention span and concentration were normal. Language testing was normal for comprehension, repetition, expression, and naming. Calculation was intact. The patient could correctly interpret a picture, and copy a diagram. General fund of knowledge was intact. Mini-mental status examination was performed with no errors.     OPHTHALMOSCOPIC: The ophthalmoscopic exam normal. The fundi were well visualized with normal disc margins, clear vessels and vascular pulsations. No disc edema. The cup/disk ratio was not enlarged. No hemorrhages or exudates were present in the posterior segments that were visualized.     CRANIAL NERVES:  Cranial nerves were normal.      CN 2- visual fields full to confrontation.      CN 3, 4, 6-  Pupils round, 4 mm in diameter, equally reactive to light. No ptosis. EOMs normal alignment, full range of movement, no nystagmus     CN 5- Facial sensation intact  bilaterally. Normal corneal reflexes.      CN 7- Normal and symmetric facial strength. Nasolabial folds symmetric.     CN 8- Hearing intact to finger rub, whisper.      CN 9- Palate elevates symmetrically. Normal gag reflex.      CN 11- Normal strength of shoulder shrug and neck turning      CN 12- Tongue midline, with normal bulk and strength; no fasciculations.     MOTOR:  Motor exam was normal. Muscle bulk and tone were normal in both upper and lower extremities. Muscle strength was 5/5 in distal and proximal muscles in both upper and lower extremities. No fasciculations, tremor or other abnormal movements were present.     Mild nuumbness in finger tips with Phalen sign.     REFLEXES:  Right/ Left:  Biceps 2/2, brachioradialis 2/2, triceps 2/2, patellar 2/2, ankle 2/2  Babinski: toes downgoing to plantar stimulation. No clonus, frontal release signs or other pathologic reflexes present.     SENSORY: Sensory exam was intact to light touch, sharp/dull, vibration and position sense in both UE and LE.     COORDINATION: MALIA were intact in upper and lower extremities.  In UE- finger-nose-finger was intact and in LE- heel-to-shin was intact without dysmetria or overshoot.      GAIT: Station was stable with a normal base and negative Romberg sign. Gait was stable with a normal arm swing and speed. No ataxia, shuffling, steppage or waddling was noted. Tandem gait was intact. Postural reflexes were normal.     Assessment/Plan   #Sensory changes  #Shooting pains  #Diffuse body and joint pain  This patient presentation is most likely fibromyalgia given the constellation of diffuse pain, joint pain, sensitivity to cold and improvement with heat, sleep disturbances.  Pt currently on Sertraline for depression. Pt could benefit from Cymbalta or TCA (amitriptyline), which could help address diffuse body pain and somatic sxs from fibromyalgia. Will proide those recommendations to pt's psychiatrist.   Pt was also counseled about  non-pharmacological interventions such as excerice, use of heaters and heating pads to help with flare-ups.    #CTS  Very mild at this time. Will manage conservatively. Pt counseled about use of braces at night.    Pt seen and discussed with attending Dr. Kami Johnson MD  PGY-5 Vascular Neurology Fellow

## 2024-10-02 ENCOUNTER — APPOINTMENT (OUTPATIENT)
Dept: PRIMARY CARE | Facility: CLINIC | Age: 39
End: 2024-10-02
Payer: COMMERCIAL

## 2024-10-02 VITALS
RESPIRATION RATE: 16 BRPM | DIASTOLIC BLOOD PRESSURE: 70 MMHG | BODY MASS INDEX: 20.29 KG/M2 | SYSTOLIC BLOOD PRESSURE: 118 MMHG | HEART RATE: 96 BPM | TEMPERATURE: 97.6 F | HEIGHT: 69 IN | WEIGHT: 137 LBS

## 2024-10-02 DIAGNOSIS — M79.7 FIBROMYALGIA: ICD-10-CM

## 2024-10-02 DIAGNOSIS — Z00.00 ENCOUNTER FOR PREVENTIVE HEALTH EXAMINATION: Primary | ICD-10-CM

## 2024-10-02 PROBLEM — F98.8 OTHER SPECIFIED BEHAVIORAL AND EMOTIONAL DISORDERS WITH ONSET USUALLY OCCURRING IN CHILDHOOD AND ADOLESCENCE: Status: RESOLVED | Noted: 2021-05-25 | Resolved: 2024-10-02

## 2024-10-02 PROBLEM — S99.921A INJURY OF RIGHT FOOT: Status: ACTIVE | Noted: 2024-10-02

## 2024-10-02 PROBLEM — F32.5 MAJOR DEPRESSION IN REMISSION (CMS-HCC): Status: ACTIVE | Noted: 2017-09-14

## 2024-10-02 PROBLEM — S99.921A INJURY OF RIGHT FOOT: Status: RESOLVED | Noted: 2024-10-02 | Resolved: 2024-10-02

## 2024-10-02 PROBLEM — L98.9 CHANGING SKIN LESION: Status: ACTIVE | Noted: 2024-10-02

## 2024-10-02 PROCEDURE — 4004F PT TOBACCO SCREEN RCVD TLK: CPT | Performed by: FAMILY MEDICINE

## 2024-10-02 PROCEDURE — 99395 PREV VISIT EST AGE 18-39: CPT | Performed by: FAMILY MEDICINE

## 2024-10-02 PROCEDURE — 3008F BODY MASS INDEX DOCD: CPT | Performed by: FAMILY MEDICINE

## 2024-10-02 RX ORDER — METHYLPHENIDATE HYDROCHLORIDE 18 MG/1
TABLET ORAL
COMMUNITY
Start: 2024-09-17

## 2024-10-02 ASSESSMENT — PROMIS GLOBAL HEALTH SCALE
RATE_AVERAGE_PAIN: 5
EMOTIONAL_PROBLEMS: OFTEN
RATE_PHYSICAL_HEALTH: FAIR
RATE_MENTAL_HEALTH: FAIR
RATE_GENERAL_HEALTH: GOOD
RATE_AVERAGE_FATIGUE: SEVERE
CARRYOUT_PHYSICAL_ACTIVITIES: MODERATELY
CARRYOUT_SOCIAL_ACTIVITIES: FAIR
RATE_SOCIAL_SATISFACTION: FAIR
RATE_QUALITY_OF_LIFE: FAIR

## 2024-10-02 NOTE — PROGRESS NOTES
Renea Montana is a 39 y.o. female here today a periodic health exam.  I reviewed previous preventative health measures including screening tests, immunizations and labs.      HPI   CURRENT COMPLAINTS OR CONCERNS:    Dxed with fibromyalgia recently by neurologist.  I reviewed the notes and all of her previous results.  She was not interested in medications when she saw the neurologist.    PREVIOUS PREVENTATIVE HEALTH    Mammogram :  NO  Pap Test : YES -- DATE 2024  [facility]    Hepatitis C Antibody :  NO  HIV Screening : NO  Tdap : YES -- DATE 2016    Yearly Flu Shot :  YES    CURRENT FINDINGS  Healthy Diet : YES  Exercise :  YES --  low impact regularly  Depression or Anxiety Issues : YES --  well controlled.  Alcohol Use : YES --  on occasion but rare  Tobacco Use : YES --  rare tobacco use but just socially.    Drug Use : NO          Current Outpatient Medications:     methylphenidate ER 18 mg extended release tablet, Take 1 oral tablet every morning, Disp: , Rfl:     propranolol (Inderal) 10 mg tablet, Take 1 tablet (10 mg) by mouth continuously if needed., Disp: , Rfl:     sertraline (Zoloft) 100 mg tablet, Take 1 tablet (100 mg) by mouth once daily., Disp: , Rfl:     Patient Active Problem List   Diagnosis    Bipolar 1 disorder (Multi)    ADHD    Recurrent major depressive disorder, in partial remission (CMS-HCC)    Irregular menses    Alteration of consciousness    Clonus    Depression    Generalized anxiety disorder    Hyper reflexia    Mild single current episode of major depressive disorder (CMS-HCC)    Social phobia, unspecified    Tremor    Numbness and tingling of both upper extremities    Numbness and tingling of lower extremity    Cold intolerance    Changing skin lesion    Fibromyalgia    Major depression in remission (CMS-HCC)    Tingling of skin         History reviewed. No pertinent surgical history.    No family history on file.    Social History     Tobacco Use     "Smoking status: Some Days     Current packs/day: 0.25     Types: Cigarettes     Passive exposure: Never    Smokeless tobacco: Never   Vaping Use    Vaping status: Never Used   Substance Use Topics    Alcohol use: Yes    Drug use: Not Currently       Immunization History   Administered Date(s) Administered    Flu vaccine, quadrivalent, no egg protein, age 6 month or greater (FLUCELVAX) 12/16/2019, 10/05/2021    MMR vaccine, subcutaneous (MMR II) 04/20/2016    Moderna SARS-CoV-2 Vaccination 04/20/2021, 05/19/2021, 01/19/2022    PPD Test 12/16/2019, 12/23/2019    Tdap vaccine, age 7 year and older (BOOSTRIX, ADACEL) 04/01/2016         Objective    Vitals:    10/02/24 1248   BP: 118/70   Pulse: 96   Resp: 16   Temp: 36.4 °C (97.6 °F)   Weight: 62.1 kg (137 lb)   Height: 1.753 m (5' 9\")        Physical Exam  Vitals and nursing note reviewed.   Constitutional:       General: She is not in acute distress.     Appearance: Normal appearance.   HENT:      Head: Normocephalic and atraumatic.      Right Ear: Tympanic membrane, ear canal and external ear normal.      Left Ear: Tympanic membrane, ear canal and external ear normal.      Nose: Nose normal.      Mouth/Throat:      Mouth: Mucous membranes are moist.      Pharynx: Oropharynx is clear.   Eyes:      Extraocular Movements: Extraocular movements intact.      Conjunctiva/sclera: Conjunctivae normal.      Pupils: Pupils are equal, round, and reactive to light.   Cardiovascular:      Rate and Rhythm: Normal rate and regular rhythm.      Pulses: Normal pulses.      Heart sounds: Normal heart sounds. No murmur heard.     No friction rub. No gallop.   Pulmonary:      Effort: Pulmonary effort is normal. No respiratory distress.      Breath sounds: Normal breath sounds.   Abdominal:      General: Abdomen is flat. Bowel sounds are normal. There is no distension.      Palpations: Abdomen is soft.      Tenderness: There is no abdominal tenderness.   Musculoskeletal:         General: " Normal range of motion.      Cervical back: Normal range of motion and neck supple.   Lymphadenopathy:      Cervical: No cervical adenopathy.   Skin:     General: Skin is warm and dry.      Findings: No lesion or rash.   Neurological:      General: No focal deficit present.      Mental Status: She is alert. Mental status is at baseline.   Psychiatric:         Mood and Affect: Mood normal.         Behavior: Behavior normal.         Thought Content: Thought content normal.         Judgment: Judgment normal.            Assessment    1. Encounter for preventive health examination     Recommend regular exercise, balanced diet, regular dental exams, and healthy habits.  Appropriate labs ordered or reviewed.  I recommend to eat plenty of plant foods (such as whole-grain products, fruits, and vegetables) and a moderate amount of lean and low-fat, animal-based food (meat and dairy products).  When shopping, choose lean meats, fish, and poultry. I recommend to increase aerobic exercise.  She plans to get a flu shot at work.  I recommend a yearly flu shot in the fall and I recommend a yearly wellness exam.         2. Fibromyalgia     This was recently diagnosed by neurology and patient does not want to start any new medications or specific treatment at this time.  I told her to monitor her symptoms carefully and if they are changing or worsening over time she can follow-up with me or neurology to discuss care options.         Anticipatory Guidance     Eat well: Eat a balanced diet that includes lots of fruits and vegetables, whole grains, nuts, seeds, and legumes. Limit processed foods, sugar, saturated fat, and salt. Aim to eat at least five servings of fruits and vegetables per day, and no more than 1 teaspoon of salt.    Exercise: Try to exercise at least 30 minutes most days of the week.    Sleep: Aim to get 7-9 hours of sleep each night. Establish a bedtime routine and create a sleep-friendly environment.    Stay  hydrated: Drink water and limit sugary beverages.    Reduce sitting time: Be mindful of your screen time.    Keep company with good people:  Set limits and boundaries.  Be selective.    Manage stress: Take breaks from the news, talk with someone you trust.    Other tips: Avoid drugs, tobacco and vaping.  Maintain a healthy weight, get regular health checkups, and limit alcohol          No orders of the defined types were placed in this encounter.       New Medications Ordered This Visit   Medications    methylphenidate ER 18 mg extended release tablet     Sig: Take 1 oral tablet every morning

## 2025-02-05 ENCOUNTER — APPOINTMENT (OUTPATIENT)
Dept: DERMATOLOGY | Facility: CLINIC | Age: 40
End: 2025-02-05
Payer: COMMERCIAL